# Patient Record
Sex: FEMALE | Race: WHITE | NOT HISPANIC OR LATINO | Employment: FULL TIME | URBAN - METROPOLITAN AREA
[De-identification: names, ages, dates, MRNs, and addresses within clinical notes are randomized per-mention and may not be internally consistent; named-entity substitution may affect disease eponyms.]

---

## 2020-10-07 ENCOUNTER — OFFICE VISIT CONVERTED (OUTPATIENT)
Dept: ORTHOPEDIC SURGERY | Facility: CLINIC | Age: 40
End: 2020-10-07
Attending: STUDENT IN AN ORGANIZED HEALTH CARE EDUCATION/TRAINING PROGRAM

## 2020-11-09 ENCOUNTER — OFFICE VISIT CONVERTED (OUTPATIENT)
Dept: ORTHOPEDIC SURGERY | Facility: CLINIC | Age: 40
End: 2020-11-09
Attending: STUDENT IN AN ORGANIZED HEALTH CARE EDUCATION/TRAINING PROGRAM
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
## 2021-05-14 VITALS — WEIGHT: 123.37 LBS | BODY MASS INDEX: 21.06 KG/M2 | HEIGHT: 64 IN | HEART RATE: 70 BPM | OXYGEN SATURATION: 97 %

## 2021-05-14 VITALS — BODY MASS INDEX: 20.14 KG/M2 | WEIGHT: 118 LBS | OXYGEN SATURATION: 98 % | HEART RATE: 86 BPM | HEIGHT: 64 IN

## 2021-09-13 ENCOUNTER — OFFICE VISIT (OUTPATIENT)
Dept: NEUROLOGY | Facility: CLINIC | Age: 41
End: 2021-09-13

## 2021-09-13 VITALS
WEIGHT: 124.4 LBS | BODY MASS INDEX: 21.24 KG/M2 | DIASTOLIC BLOOD PRESSURE: 66 MMHG | SYSTOLIC BLOOD PRESSURE: 103 MMHG | HEART RATE: 75 BPM | HEIGHT: 64 IN

## 2021-09-13 DIAGNOSIS — Z87.828 HISTORY OF HEAD INJURY: ICD-10-CM

## 2021-09-13 DIAGNOSIS — R41.3 MEMORY LOSS: Primary | ICD-10-CM

## 2021-09-13 DIAGNOSIS — F07.81 POST CONCUSSIVE SYNDROME: ICD-10-CM

## 2021-09-13 PROCEDURE — 99204 OFFICE O/P NEW MOD 45 MIN: CPT | Performed by: NURSE PRACTITIONER

## 2021-09-13 RX ORDER — DIAZEPAM 5 MG/1
5 TABLET ORAL 2 TIMES DAILY PRN
COMMUNITY
Start: 2021-08-20 | End: 2022-04-14

## 2021-09-13 RX ORDER — SECUKINUMAB 150 MG/ML
150 INJECTION SUBCUTANEOUS
COMMUNITY
Start: 2021-08-25

## 2021-09-13 RX ORDER — FLUOXETINE HYDROCHLORIDE 40 MG/1
40 CAPSULE ORAL DAILY
COMMUNITY
Start: 2021-08-27 | End: 2022-04-14
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
## 2021-09-13 NOTE — PATIENT INSTRUCTIONS
Concussion, Adult    A concussion is a brain injury from a hard, direct hit (trauma) to the head or body. This direct hit causes the brain to shake quickly back and forth inside the skull. This can damage brain cells and cause chemical changes in the brain. A concussion may also be known as a mild traumatic brain injury (TBI).  Concussions are usually not life-threatening, but the effects of a concussion can be serious. If you have a concussion, you should be very careful to avoid having a second concussion.  What are the causes?  This condition is caused by:  · A direct hit to your head, such as:  ? Running into another player during a game.  ? Being hit in a fight.  ? Hitting your head on a hard surface.  · Sudden movement of your body that causes your brain to move back and forth inside the skull, such as in a car crash.  What are the signs or symptoms?  The signs of a concussion can be hard to notice. Early on, they may be missed by you, family members, and health care providers. You may look fine on the outside but may act or feel differently.  Every head injury is different. Symptoms are usually temporary but may last for days, weeks, or even months. Some symptoms appear right away, but other symptoms may not show up for hours or days. If your symptoms last longer than normal, you may have post-concussion syndrome.  Physical symptoms  · Headaches.  · Dizziness and problems with coordination or balance.  · Sensitivity to light or noise.  · Nausea or vomiting.  · Tiredness (fatigue).  · Vision or hearing problems.  · Changes in eating or sleeping patterns.  · Seizure.  Mental and emotional symptoms  · Irritability or mood changes.  · Memory problems.  · Trouble concentrating, organizing, or making decisions.  · Slowness in thinking, acting or reacting, speaking, or reading.  · Anxiety or depression.  How is this diagnosed?  This condition is diagnosed based on:  · Your symptoms.  · A description of your  injury.  You may also have tests, including:  · Imaging tests, such as a CT scan or an MRI.  · Neuropsychological tests. These measure your thinking, understanding, learning, and remembering abilities.  How is this treated?  Treatment for this condition includes:  · Stopping sports or activity if you are injured. If you hit your head or show signs of concussion:  ? Do not return to sports or activities the same day.  ? Get checked by a health care provider before you return to your activities.  · Physical and mental rest and careful observation, usually at home. Gradually return to your normal activities.  · Medicines to help with symptoms such as headaches, nausea, or difficulty sleeping.  ? Avoid taking opioid pain medicine while recovering from a concussion.  · Avoiding alcohol and drugs. These may slow your recovery and can put you at risk of further injury.  · Referral to a concussion clinic or rehabilitation center.  Recovery from a concussion can take time. How fast you recover depends on many factors. Return to activities only when:  · Your symptoms are completely gone.  · Your health care provider says that it is safe.  Follow these instructions at home:  Activity  · Limit activities that require a lot of thought or concentration, such as:  ? Doing homework or job-related work.  ? Watching TV.  ? Working on the computer or phone.  ? Playing memory games and puzzles.  · Rest. Rest helps your brain heal. Make sure you:  ? Get plenty of sleep. Most adults should get 7-9 hours of sleep each night.  ? Rest during the day. Take naps or rest breaks when you feel tired.  · Avoid physical activity like exercise until your health care provider says it is safe. Stop any activity that worsens symptoms.  · Do not do high-risk activities that could cause a second concussion, such as riding a bike or playing sports.  · Ask your health care provider when you can return to your normal activities, such as school, work,  athletics, and driving. Your ability to react may be slower after a brain injury. Never do these activities if you are dizzy. Your health care provider will likely give you a plan for gradually returning to activities.  General instructions    · Take over-the-counter and prescription medicines only as told by your health care provider. Some medicines, such as blood thinners (anticoagulants) and aspirin, may increase the risk for complications, such as bleeding.  · Do not drink alcohol until your health care provider says you can.  · Watch your symptoms and tell others around you to do the same. Complications sometimes occur after a concussion. Older adults with a brain injury may have a higher risk of serious complications.  · Tell your , teachers, school nurse, school counselor, , or  about your injury, symptoms, and restrictions.  · Keep all follow-up visits as told by your health care provider. This is important.    How is this prevented?  Avoiding another brain injury is very important. In rare cases, another injury can lead to permanent brain damage, brain swelling, or death. The risk of this is greatest during the first 7-10 days after a head injury. Avoid injuries by:  · Stopping activities that could lead to a second concussion, such as contact or recreational sports, until your health care provider says it is okay.  · Taking these actions once you have returned to sports or activities:  ? Avoiding plays or moves that can cause you to crash into another person. This is how most concussions occur.  ? Following the rules and being respectful of other players. Do not engage in violent or illegal plays.  · Getting regular exercise that includes strength and balance training.  · Wearing a properly fitting helmet during sports, biking, or other activities. Helmets can help protect you from serious skull and brain injuries, but they may not protect you from a concussion. Even when  wearing a helmet, you should avoid being hit in the head.  Contact a health care provider if:  · Your symptoms do not improve.  · You have new symptoms.  · You have another injury.  Get help right away if:  · You have new or worsening physical symptoms, such as:  ? A severe or worsening headache.  ? Weakness or numbness in any part of your body, slurred speech, vision changes, or confusion.  ? Your coordination gets worse.  ? Vomiting repeatedly.  ? You have a seizure.  ? You have unusual behavior changes.  ? You lose consciousness, are sleepier than normal, or are difficult to wake up.  These symptoms may represent a serious problem that is an emergency. Do not wait to see if the symptoms will go away. Get medical help right away. Call your local emergency services (911 in the [place] Do not drive yourself to the hospital.  Summary  · A concussion is a brain injury that results from a hard, direct hit (trauma) to your head or body.  · You may have imaging tests and neuropsychological tests to diagnose a concussion.  · Treatment for this condition includes physical and mental rest and careful observation.  · Ask your health care provider when you can return to your normal activities, such as school, work, athletics, and driving.  · Get help right away if you have a severe headache, weakness in any part of the body, seizures, behavior changes, changes in vision, or if you are confused or sleepier than normal.  This information is not intended to replace advice given to you by your health care provider. Make sure you discuss any questions you have with your health care provider.  Document Revised: 10/29/2020 Document Reviewed: 10/29/2020  Elsevier Patient Education © 2021 [employer]
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
## 2021-09-27 ENCOUNTER — LAB (OUTPATIENT)
Dept: LAB | Facility: HOSPITAL | Age: 41
End: 2021-09-27

## 2021-09-27 ENCOUNTER — HOSPITAL ENCOUNTER (OUTPATIENT)
Dept: MRI IMAGING | Facility: HOSPITAL | Age: 41
Discharge: HOME | End: 2021-09-27

## 2021-09-27 DIAGNOSIS — F07.81 POST CONCUSSIVE SYNDROME: ICD-10-CM

## 2021-09-27 DIAGNOSIS — R41.3 MEMORY LOSS: ICD-10-CM

## 2021-09-27 DIAGNOSIS — Z87.828 HISTORY OF HEAD INJURY: ICD-10-CM

## 2021-09-27 LAB
ALBUMIN SERPL-MCNC: 4.3 G/DL (ref 3.5–5.2)
ALBUMIN/GLOB SERPL: 1.3 G/DL
ALP SERPL-CCNC: 59 U/L (ref 39–117)
ALT SERPL W P-5'-P-CCNC: 17 U/L (ref 1–33)
ANION GAP SERPL CALCULATED.3IONS-SCNC: 9.5 MMOL/L (ref 5–15)
AST SERPL-CCNC: 20 U/L (ref 1–32)
BILIRUB SERPL-MCNC: 0.2 MG/DL (ref 0–1.2)
BUN SERPL-MCNC: 19 MG/DL (ref 6–20)
BUN/CREAT SERPL: 24.7 (ref 7–25)
CALCIUM SPEC-SCNC: 8.8 MG/DL (ref 8.6–10.5)
CHLORIDE SERPL-SCNC: 104 MMOL/L (ref 98–107)
CO2 SERPL-SCNC: 24.5 MMOL/L (ref 22–29)
CREAT SERPL-MCNC: 0.77 MG/DL (ref 0.57–1)
DEPRECATED RDW RBC AUTO: 47.3 FL (ref 37–54)
ERYTHROCYTE [DISTWIDTH] IN BLOOD BY AUTOMATED COUNT: 14.6 % (ref 12.3–15.4)
FOLATE SERPL-MCNC: 7.2 NG/ML (ref 4.78–24.2)
GFR SERPL CREATININE-BSD FRML MDRD: 82 ML/MIN/1.73
GLOBULIN UR ELPH-MCNC: 3.3 GM/DL
GLUCOSE SERPL-MCNC: 84 MG/DL (ref 65–99)
HCT VFR BLD AUTO: 32.3 % (ref 34–46.6)
HGB BLD-MCNC: 10.5 G/DL (ref 12–15.9)
MCH RBC QN AUTO: 28.8 PG (ref 26.6–33)
MCHC RBC AUTO-ENTMCNC: 32.5 G/DL (ref 31.5–35.7)
MCV RBC AUTO: 88.7 FL (ref 79–97)
PLATELET # BLD AUTO: 495 10*3/MM3 (ref 140–450)
PMV BLD AUTO: 9.7 FL (ref 6–12)
POTASSIUM SERPL-SCNC: 4.2 MMOL/L (ref 3.5–5.2)
PROT SERPL-MCNC: 7.6 G/DL (ref 6–8.5)
RBC # BLD AUTO: 3.64 10*6/MM3 (ref 3.77–5.28)
SODIUM SERPL-SCNC: 138 MMOL/L (ref 136–145)
VIT B12 BLD-MCNC: 381 PG/ML (ref 211–946)
WBC # BLD AUTO: 5.2 10*3/MM3 (ref 3.4–10.8)

## 2021-09-27 PROCEDURE — 85027 COMPLETE CBC AUTOMATED: CPT

## 2021-09-27 PROCEDURE — 36415 COLL VENOUS BLD VENIPUNCTURE: CPT

## 2021-09-27 PROCEDURE — 0 GADOBENATE DIMEGLUMINE 529 MG/ML SOLUTION: Performed by: NURSE PRACTITIONER

## 2021-09-27 PROCEDURE — 80053 COMPREHEN METABOLIC PANEL: CPT

## 2021-09-27 PROCEDURE — 82746 ASSAY OF FOLIC ACID SERUM: CPT

## 2021-09-27 PROCEDURE — 82607 VITAMIN B-12: CPT

## 2021-09-27 PROCEDURE — A9577 INJ MULTIHANCE: HCPCS | Performed by: NURSE PRACTITIONER

## 2021-09-27 PROCEDURE — 70553 MRI BRAIN STEM W/O & W/DYE: CPT

## 2021-09-27 RX ADMIN — GADOBENATE DIMEGLUMINE 10 ML: 529 INJECTION, SOLUTION INTRAVENOUS at 07:45

## 2021-09-28 ENCOUNTER — TELEPHONE (OUTPATIENT)
Dept: NEUROLOGY | Facility: CLINIC | Age: 41
End: 2021-09-28

## 2021-09-28 RX ORDER — MAGNESIUM 200 MG
1000 TABLET ORAL DAILY
Qty: 30 EACH | Refills: 3 | Status: SHIPPED | OUTPATIENT
Start: 2021-09-28 | End: 2022-04-14

## 2021-09-28 NOTE — TELEPHONE ENCOUNTER
----- Message from QUIQUE Mariano sent at 9/28/2021  7:46 AM EDT -----  B12 low, I have called in SL supplementation.

## 2021-09-28 NOTE — TELEPHONE ENCOUNTER
I called and notified the pt and she demonstrated understanding by restatement. I asked that she call with furhter questions or concerns and she said she would.  I faxed labs to [facility] Michele Mcgovern @ [phone]

## 2021-10-04 ENCOUNTER — TELEPHONE (OUTPATIENT)
Dept: NEUROLOGY | Facility: CLINIC | Age: 41
End: 2021-10-04

## 2021-10-04 NOTE — TELEPHONE ENCOUNTER
PATIENT AWARE AND VERBALIZED UNDERSTANDING. PATIENT STATED SHE WILL PICKUP HER MEDICATION AND START ASAP.

## 2022-01-11 RX ORDER — MAGNESIUM 200 MG
TABLET ORAL
Qty: 30 TABLET | Refills: 3 | OUTPATIENT
Start: 2022-01-11

## 2022-01-11 NOTE — TELEPHONE ENCOUNTER
Refill denied. Pt canceled last appointment on 10/26/2021 and does not currently have a f/u on the schedule.

## 2022-01-18 RX ORDER — MAGNESIUM 200 MG
TABLET ORAL
Qty: 30 TABLET | Refills: 3 | OUTPATIENT
Start: 2022-01-18

## 2022-01-24 RX ORDER — MAGNESIUM 200 MG
TABLET ORAL
Qty: 30 TABLET | Refills: 3 | OUTPATIENT
Start: 2022-01-24

## 2022-02-02 ENCOUNTER — TELEPHONE (OUTPATIENT)
Dept: FAMILY MEDICINE CLINIC | Facility: CLINIC | Age: 42
End: 2022-02-02

## 2022-02-02 NOTE — TELEPHONE ENCOUNTER
jamal was calling about getting a new diagnostic order for patient's left breast.   Patient appointment it 2/7/22 at 9:30am

## 2022-04-14 ENCOUNTER — OFFICE VISIT (OUTPATIENT)
Dept: OBSTETRICS AND GYNECOLOGY | Facility: CLINIC | Age: 42
End: 2022-04-14

## 2022-04-14 VITALS
DIASTOLIC BLOOD PRESSURE: 69 MMHG | HEART RATE: 76 BPM | WEIGHT: 126 LBS | SYSTOLIC BLOOD PRESSURE: 115 MMHG | BODY MASS INDEX: 21.51 KG/M2 | HEIGHT: 64 IN

## 2022-04-14 DIAGNOSIS — N93.9 ABNORMAL UTERINE BLEEDING (AUB): ICD-10-CM

## 2022-04-14 DIAGNOSIS — Z32.02 PREGNANCY TEST NEGATIVE: Primary | ICD-10-CM

## 2022-04-14 LAB
B-HCG UR QL: NEGATIVE
C TRACH RRNA CVX QL NAA+PROBE: NOT DETECTED
CANDIDA SPECIES: POSITIVE
EXPIRATION DATE: NORMAL
GARDNERELLA VAGINALIS: POSITIVE
INTERNAL NEGATIVE CONTROL: NORMAL
INTERNAL POSITIVE CONTROL: NORMAL
Lab: NORMAL
N GONORRHOEA RRNA SPEC QL NAA+PROBE: NOT DETECTED
T VAGINALIS DNA VAG QL PROBE+SIG AMP: NEGATIVE

## 2022-04-14 PROCEDURE — G0123 SCREEN CERV/VAG THIN LAYER: HCPCS | Performed by: STUDENT IN AN ORGANIZED HEALTH CARE EDUCATION/TRAINING PROGRAM

## 2022-04-14 PROCEDURE — 81025 URINE PREGNANCY TEST: CPT | Performed by: STUDENT IN AN ORGANIZED HEALTH CARE EDUCATION/TRAINING PROGRAM

## 2022-04-14 PROCEDURE — 87510 GARDNER VAG DNA DIR PROBE: CPT | Performed by: STUDENT IN AN ORGANIZED HEALTH CARE EDUCATION/TRAINING PROGRAM

## 2022-04-14 PROCEDURE — 87624 HPV HI-RISK TYP POOLED RSLT: CPT | Performed by: STUDENT IN AN ORGANIZED HEALTH CARE EDUCATION/TRAINING PROGRAM

## 2022-04-14 PROCEDURE — 87480 CANDIDA DNA DIR PROBE: CPT | Performed by: STUDENT IN AN ORGANIZED HEALTH CARE EDUCATION/TRAINING PROGRAM

## 2022-04-14 PROCEDURE — 99204 OFFICE O/P NEW MOD 45 MIN: CPT | Performed by: STUDENT IN AN ORGANIZED HEALTH CARE EDUCATION/TRAINING PROGRAM

## 2022-04-14 PROCEDURE — 87491 CHLMYD TRACH DNA AMP PROBE: CPT | Performed by: STUDENT IN AN ORGANIZED HEALTH CARE EDUCATION/TRAINING PROGRAM

## 2022-04-14 PROCEDURE — 87591 N.GONORRHOEAE DNA AMP PROB: CPT | Performed by: STUDENT IN AN ORGANIZED HEALTH CARE EDUCATION/TRAINING PROGRAM

## 2022-04-14 PROCEDURE — 87660 TRICHOMONAS VAGIN DIR PROBE: CPT | Performed by: STUDENT IN AN ORGANIZED HEALTH CARE EDUCATION/TRAINING PROGRAM

## 2022-04-14 RX ORDER — FERROUS SULFATE 325(65) MG
325 TABLET ORAL EVERY OTHER DAY
Qty: 30 TABLET | Refills: 1 | Status: SHIPPED | OUTPATIENT
Start: 2022-04-14

## 2022-04-14 NOTE — PROGRESS NOTES
"GYN Problem/Follow Up Visit    Chief Complaint   Patient presents with   • AUB           HPI  Dian Perea is a 41 y.o. female, , who presents for evaluation for abnormal uterine bleeding. Periods have been off since Nov/Dec.  Did evaluation at PCP and was found to be anemic. Started menses at 9 yrs of age. Reports regular menses, 28-29 days between, her periods would last 4-5 days. She would not have any pain associated with them. 2 previous vaginal deliveries, last . Since Nov/Dec 2021 her menses began becoming every 2 weeks. She did have a cycle of of 14 days in December. /Feb they had improved. March has been normal. Denies any vaginal discharge, no vaginal irritation. She is sexually active, no blood after intercourse. No hx of STIs. No blood in urine no dysuria. No issues with BM, no blood in stools. No pelvic pressure or pain. Nonsmoker. Reports Hx of abnormal PAP smear; reports repeated 6 months later and normal. Did have a tubal ligation via laparoscopy. No other abdominal surgeries. Reports getting routine labs with PCP and no abnormalities. Denies feeling fatigued, weak or any change in appetite. She was not started on iron supplementation.  Did have COVID in January of this year.    Additional OB/GYN History   Patient's last menstrual period was 2022.  Current contraception: contraceptive methods: Tubal ligation  Desires to: do not start contraception  Allergies : Penicillins     The additional following portions of the patient's history were reviewed and updated as appropriate: allergies, current medications, past family history, past medical history, past social history, past surgical history and problem list.    Review of Systems    I have reviewed and agree with the HPI, ROS, and historical information as entered above. Barertt Kyle MD    Objective   /69   Pulse 76   Ht 162.6 cm (64\")   Wt 57.2 kg (126 lb)   LMP 2022   BMI 21.63 kg/m²     Physical " Exam  Vitals and nursing note reviewed. Exam conducted with a chaperone present.   Constitutional:       General: She is not in acute distress.     Appearance: Normal appearance. She is not toxic-appearing.   HENT:      Head: Normocephalic and atraumatic.   Eyes:      Extraocular Movements: Extraocular movements intact.      Conjunctiva/sclera: Conjunctivae normal.   Cardiovascular:      Pulses: Normal pulses.   Pulmonary:      Effort: Pulmonary effort is normal.   Abdominal:      General: There is no distension.      Palpations: Abdomen is soft.      Tenderness: There is no abdominal tenderness.   Genitourinary:     General: Normal vulva.      Exam position: Lithotomy position.      Labia:         Right: No tenderness, lesion or injury.         Left: No tenderness, lesion or injury.       Vagina: Normal.      Cervix: Normal.      Uterus: Normal.       Adnexa: Right adnexa normal and left adnexa normal.   Musculoskeletal:      Cervical back: Normal range of motion.   Skin:     General: Skin is warm and dry.   Neurological:      Mental Status: She is alert and oriented to person, place, and time.   Psychiatric:         Mood and Affect: Mood normal.         Behavior: Behavior normal.         Thought Content: Thought content normal.            Assessment and Plan  Dian Perea is a 41 y.o.  presenting for acute abnormal uterine bleeding.  Patient asymptomatic from blood loss.  Review of outside records showing hemoglobin hematocrit 9.7/31.2 MCV 88.  Iron battery showing low ferritin.  Discussed etiologies of abnormal uterine bleeding.  Recommendation for vitamin B12 and folate for MCV normal though showing iron deficiency anemia.  Also recommendation for getting TSH/T4.  Infectious work-up done today also recommendation for transvaginal ultrasound to look for any structural abnormality.  Discussed with patient this could have been reflective of her recent infection with COVID as we have been seeing  abnormal uterine bleeding associated with COVID infection.  She will follow-up in 8 to 12 weeks.  Recommendation for getting iron every other day.    Diagnoses and all orders for this visit:    1. Pregnancy test negative (Primary)  -     POC Pregnancy, Urine    2. Abnormal uterine bleeding (AUB)  Overview:  4/14/2022 Earlier this year; COVID in January. Cycles regulating since    Orders:  -     Gardnerella vaginalis, Trichomonas vaginalis, Candida albicans, DNA - Swab, Vagina  -     Chlamydia trachomatis, Neisseria gonorrhoeae, PCR - Swab, Cervix  -     IgP, Aptima HPV  -     TSH  -     T4, Free  -     Vitamin B12 & Folate  -     US Non-ob Transvaginal; Future  -     ferrous sulfate 325 (65 FE) MG tablet; Take 1 tablet by mouth Every Other Day.  Dispense: 30 tablet; Refill: 1    Other orders  -     SCANNED - LABS  -     SCANNED - LABS  -     SCANNED - LABS      Counseling:  • Track menstrual cycle  • Bleeding precautions      She understands the importance of having the above orders performed in a timely fashion.  The risks of not performing them include, but are not limited to, cancer and/or subsequent increase in morbidity and/or mortality.  She is encouraged to review her results online and/or contact or office if she has questions.     Follow Up:  Return in about 8 weeks (around 6/9/2022) for Next scheduled follow up.        Barrett Kyle MD  04/14/2022

## 2022-04-15 RX ORDER — METRONIDAZOLE 500 MG/1
500 TABLET ORAL 2 TIMES DAILY
Qty: 14 TABLET | Refills: 0 | Status: SHIPPED | OUTPATIENT
Start: 2022-04-15 | End: 2022-04-22

## 2022-04-15 RX ORDER — FLUCONAZOLE 150 MG/1
150 TABLET ORAL ONCE
Qty: 1 TABLET | Refills: 0 | Status: SHIPPED | OUTPATIENT
Start: 2022-04-15 | End: 2022-04-15

## 2022-04-19 ENCOUNTER — TELEPHONE (OUTPATIENT)
Dept: OBSTETRICS AND GYNECOLOGY | Facility: CLINIC | Age: 42
End: 2022-04-19

## 2022-04-19 NOTE — TELEPHONE ENCOUNTER
Patient informed she needs to have the labs and will have those collected when she goes for her ultrasound tomorrow.

## 2022-04-19 NOTE — TELEPHONE ENCOUNTER
Called patient to discuss getting labs ordered 4/14. She states that she had recently had labs at her PCP's office and didn't remember discussing her needing more labs. Do you still want her to have the TSH, Free T4, and Vitamin B12 & Folate collected? She is going for an ultrasound 4/20/22 in the afternoon and can have it collected then if still needed. Please advise.

## 2022-04-20 ENCOUNTER — LAB (OUTPATIENT)
Dept: LAB | Facility: HOSPITAL | Age: 42
End: 2022-04-20

## 2022-04-20 ENCOUNTER — HOSPITAL ENCOUNTER (OUTPATIENT)
Dept: ULTRASOUND IMAGING | Facility: HOSPITAL | Age: 42
Discharge: HOME | End: 2022-04-20

## 2022-04-20 DIAGNOSIS — N93.9 ABNORMAL UTERINE BLEEDING (AUB): ICD-10-CM

## 2022-04-20 LAB
FOLATE SERPL-MCNC: 8.62 NG/ML (ref 4.78–24.2)
T4 FREE SERPL-MCNC: 1.17 NG/DL (ref 0.93–1.7)
TSH SERPL DL<=0.05 MIU/L-ACNC: 1.5 UIU/ML (ref 0.27–4.2)
VIT B12 BLD-MCNC: 369 PG/ML (ref 211–946)

## 2022-04-20 PROCEDURE — 82607 VITAMIN B-12: CPT | Performed by: STUDENT IN AN ORGANIZED HEALTH CARE EDUCATION/TRAINING PROGRAM

## 2022-04-20 PROCEDURE — 76830 TRANSVAGINAL US NON-OB: CPT

## 2022-04-20 PROCEDURE — 82746 ASSAY OF FOLIC ACID SERUM: CPT | Performed by: STUDENT IN AN ORGANIZED HEALTH CARE EDUCATION/TRAINING PROGRAM

## 2022-04-20 PROCEDURE — 36415 COLL VENOUS BLD VENIPUNCTURE: CPT | Performed by: STUDENT IN AN ORGANIZED HEALTH CARE EDUCATION/TRAINING PROGRAM

## 2022-04-20 PROCEDURE — 84439 ASSAY OF FREE THYROXINE: CPT | Performed by: STUDENT IN AN ORGANIZED HEALTH CARE EDUCATION/TRAINING PROGRAM

## 2022-04-20 PROCEDURE — 84443 ASSAY THYROID STIM HORMONE: CPT | Performed by: STUDENT IN AN ORGANIZED HEALTH CARE EDUCATION/TRAINING PROGRAM

## 2022-04-22 LAB
CYTOLOGIST CVX/VAG CYTO: ABNORMAL
CYTOLOGY CVX/VAG DOC CYTO: ABNORMAL
CYTOLOGY CVX/VAG DOC THIN PREP: ABNORMAL
DX ICD CODE: ABNORMAL
DX ICD CODE: ABNORMAL
HIV 1 & 2 AB SER-IMP: ABNORMAL
HPV I/H RISK 4 DNA CVX QL PROBE+SIG AMP: NEGATIVE
OTHER STN SPEC: ABNORMAL
PATHOLOGIST CVX/VAG CYTO: ABNORMAL
STAT OF ADQ CVX/VAG CYTO-IMP: ABNORMAL

## 2022-04-25 ENCOUNTER — TELEPHONE (OUTPATIENT)
Dept: OBSTETRICS AND GYNECOLOGY | Facility: CLINIC | Age: 42
End: 2022-04-25

## 2022-04-25 NOTE — TELEPHONE ENCOUNTER
----- Message from Barrett Kyle MD sent at 4/23/2022 10:50 AM EDT -----  ASCUS with HPV negative; [facility] recommendation for PAP/cotesting in 3 years.

## 2022-04-25 NOTE — TELEPHONE ENCOUNTER
Patient advised of pap results being ASCUS/HPV negative and recommendations being to repeat pap with cotest in 3 years. Advised would still need yearly breast and pelvic exam. Also advised labs were WNL.

## 2022-06-01 DIAGNOSIS — N76.0 BV (BACTERIAL VAGINOSIS): ICD-10-CM

## 2022-06-01 DIAGNOSIS — B96.89 BV (BACTERIAL VAGINOSIS): ICD-10-CM

## 2022-06-01 DIAGNOSIS — B37.9 YEAST INFECTION: Primary | ICD-10-CM

## 2022-06-01 RX ORDER — FLUCONAZOLE 150 MG/1
150 TABLET ORAL ONCE
COMMUNITY
Start: 2022-04-15 | End: 2022-06-01 | Stop reason: SDUPTHER

## 2022-06-01 RX ORDER — METRONIDAZOLE 500 MG/1
500 TABLET ORAL 2 TIMES DAILY
Qty: 14 TABLET | Refills: 0 | Status: SHIPPED | OUTPATIENT
Start: 2022-06-01 | End: 2022-06-08

## 2022-06-01 RX ORDER — FLUCONAZOLE 150 MG/1
150 TABLET ORAL ONCE
Qty: 1 TABLET | Refills: 0 | Status: SHIPPED | OUTPATIENT
Start: 2022-06-01 | End: 2022-06-01

## 2022-06-01 NOTE — TELEPHONE ENCOUNTER
Pt called requesting refill on diflucan and flagyl 500 that was prescribed by DR. CASTANEDA back in April. Pt states she is having all the same sx as last time.    1689

## 2022-06-15 ENCOUNTER — OFFICE VISIT (OUTPATIENT)
Dept: OBSTETRICS AND GYNECOLOGY | Facility: CLINIC | Age: 42
End: 2022-06-15

## 2022-06-15 VITALS
BODY MASS INDEX: 21.46 KG/M2 | WEIGHT: 125 LBS | DIASTOLIC BLOOD PRESSURE: 53 MMHG | SYSTOLIC BLOOD PRESSURE: 112 MMHG | HEART RATE: 81 BPM

## 2022-06-15 DIAGNOSIS — N93.9 ABNORMAL UTERINE BLEEDING (AUB): Primary | ICD-10-CM

## 2022-06-15 PROCEDURE — 99213 OFFICE O/P EST LOW 20 MIN: CPT | Performed by: STUDENT IN AN ORGANIZED HEALTH CARE EDUCATION/TRAINING PROGRAM

## 2022-06-15 NOTE — PROGRESS NOTES
GYN Problem/Follow Up Visit    Chief Complaint   Patient presents with   • Follow-up     AUB           HPI  Dian Perea is a 41 y.o. female, , who presents for follow up for abnormal uterine bleeding. Normal February to May. Had period 2022; normal 3-5 days ; and then had one day bleeding on .  Was treated for BV and yeast. This was treated. Did have concerns last week for BV and requested another Flagyl refill. It will be a week tomorrow when this is completed. This was spurred by odor and irritation. Not taking any medications.  Denies any concerns with her bleeding profile.    Additional OB/GYN History   Patient's last menstrual period was 2022.  Current contraception: contraceptive methods: Tubal ligation  Desires to: continue contraception  Allergies : Penicillins     The additional following portions of the patient's history were reviewed and updated as appropriate: allergies, current medications, past family history, past medical history, past social history, past surgical history and problem list.    Review of Systems   Constitutional: Negative for fatigue and fever.   Respiratory: Negative for cough and shortness of breath.    Cardiovascular: Negative for chest pain.   Gastrointestinal: Negative for abdominal distention and abdominal pain.   Genitourinary: Positive for vaginal discharge. Negative for difficulty urinating, dysuria and menstrual problem.       I have reviewed and agree with the HPI, ROS, and historical information as entered above. Barrett Kyle MD    Objective   /53   Pulse 81   Wt 56.7 kg (125 lb)   LMP 2022   BMI 21.46 kg/m²     Physical Exam  Vitals and nursing note reviewed.   Constitutional:       General: She is not in acute distress.     Appearance: Normal appearance. She is not toxic-appearing.   HENT:      Head: Normocephalic and atraumatic.   Eyes:      Extraocular Movements: Extraocular movements intact.       Conjunctiva/sclera: Conjunctivae normal.   Cardiovascular:      Pulses: Normal pulses.   Pulmonary:      Effort: Pulmonary effort is normal.   Abdominal:      General: There is no distension.      Palpations: Abdomen is soft.      Tenderness: There is no abdominal tenderness.   Musculoskeletal:      Cervical back: Normal range of motion.   Skin:     General: Skin is warm and dry.   Neurological:      Mental Status: She is alert and oriented to person, place, and time.   Psychiatric:         Mood and Affect: Mood normal.         Behavior: Behavior normal.         Thought Content: Thought content normal.            Assessment and Plan  Dian Perea is a 41 y.o.  following up for abnormal uterine bleeding.  Reports that.  Profile has returned to normal since February.  She did have a 1 day episode of bleeding here a couple days ago which was not bothersome to her.  Discussed with her the results of previous labs which she got treated for which showed a bacterial vaginosis and a yeast infection.  She recently requested additional prescription of Flagyl for odor and irritation and will have be a week tomorrow from completion of that.  Discussed with Dian the results of the transvaginal ultrasound showing the potential of a small submucosal fibroid abutting the fundal region of her endometrial stripe.  Discussed that fibroids are more than not benign and can have symptoms with regard to pelvic pressure, pelvic pain, mass-effect, and causes for abnormal bleeding.  At this time Dian feels that her periods have returned to normal and she would like to just monitor for symptoms as opposed to having any type of surgical intervention.  This is a reasonable course of action as patient is asymptomatic and has no other concerns.  Discussion regards to recurrent bacterial vaginosis and overtreatment with Flagyl.  Discussed if has having more events greater than 3 times per year we should look at the different  antibiotic course as opposed to Flagyl.  At this point time she said that that is not necessary.  We will follow-up in 1 year for an annual examination.  At her evaluation in April she did have an ASCUS Pap smear with HPV negativity    Diagnoses and all orders for this visit:    1. Abnormal uterine bleeding (AUB) (Primary)  Overview:  4/14/2022 Earlier this year; COVID in January. Cycles regulating since        She understands the importance of having the above orders performed in a timely fashion.  The risks of not performing them include, but are not limited to, cancer and/or subsequent increase in morbidity and/or mortality.  She is encouraged to review her results online and/or contact or office if she has questions.     Follow Up:  Return in about 10 months (around 4/1/2023) for Annual physical.        Barrett Kyle MD  06/15/2022

## 2022-06-29 DIAGNOSIS — N76.0 ACUTE VAGINITIS: Primary | ICD-10-CM

## 2022-06-29 RX ORDER — METRONIDAZOLE 500 MG/1
500 TABLET ORAL 2 TIMES DAILY
Qty: 14 TABLET | Refills: 0 | Status: SHIPPED | OUTPATIENT
Start: 2022-06-29 | End: 2022-07-06

## 2022-06-29 RX ORDER — FLUCONAZOLE 150 MG/1
150 TABLET ORAL
Qty: 2 TABLET | Refills: 0 | Status: SHIPPED | OUTPATIENT
Start: 2022-06-29 | End: 2022-07-03

## 2022-06-29 NOTE — TELEPHONE ENCOUNTER
Patient called requesting another refill on the Diflucan and Flagyl she was given 6/1. She is currently out of town in [place] and complains of vaginal irritation, odor and itching. Please sign the attached orders if she can have a refill again.

## 2022-10-23 ENCOUNTER — HOSPITAL ENCOUNTER (EMERGENCY)
Facility: HOSPITAL | Age: 42
Discharge: LEFT BEFORE DISCHARGE | End: 2022-10-23

## 2022-10-23 VITALS
DIASTOLIC BLOOD PRESSURE: 69 MMHG | TEMPERATURE: 98 F | WEIGHT: 134.7 LBS | RESPIRATION RATE: 18 BRPM | OXYGEN SATURATION: 100 % | HEIGHT: 64 IN | HEART RATE: 100 BPM | BODY MASS INDEX: 23 KG/M2 | SYSTOLIC BLOOD PRESSURE: 108 MMHG

## 2022-10-23 LAB
ALBUMIN SERPL-MCNC: 4.3 G/DL (ref 3.5–5.2)
ALBUMIN/GLOB SERPL: 1.1 G/DL
ALP SERPL-CCNC: 65 U/L (ref 39–117)
ALT SERPL W P-5'-P-CCNC: 14 U/L (ref 1–33)
ANION GAP SERPL CALCULATED.3IONS-SCNC: 10.3 MMOL/L (ref 5–15)
AST SERPL-CCNC: 17 U/L (ref 1–32)
BACTERIA UR QL AUTO: ABNORMAL /HPF
BASOPHILS # BLD AUTO: 0.04 10*3/MM3 (ref 0–0.2)
BASOPHILS NFR BLD AUTO: 0.5 % (ref 0–1.5)
BILIRUB SERPL-MCNC: <0.2 MG/DL (ref 0–1.2)
BILIRUB UR QL STRIP: NEGATIVE
BUN SERPL-MCNC: 15 MG/DL (ref 6–20)
BUN/CREAT SERPL: 13.8 (ref 7–25)
CALCIUM SPEC-SCNC: 8.8 MG/DL (ref 8.6–10.5)
CHLORIDE SERPL-SCNC: 104 MMOL/L (ref 98–107)
CLARITY UR: ABNORMAL
CO2 SERPL-SCNC: 23.7 MMOL/L (ref 22–29)
COLOR UR: YELLOW
CREAT SERPL-MCNC: 1.09 MG/DL (ref 0.57–1)
DEPRECATED RDW RBC AUTO: 50.3 FL (ref 37–54)
EGFRCR SERPLBLD CKD-EPI 2021: 64.8 ML/MIN/1.73
EOSINOPHIL # BLD AUTO: 0.01 10*3/MM3 (ref 0–0.4)
EOSINOPHIL NFR BLD AUTO: 0.1 % (ref 0.3–6.2)
ERYTHROCYTE [DISTWIDTH] IN BLOOD BY AUTOMATED COUNT: 16.4 % (ref 12.3–15.4)
GLOBULIN UR ELPH-MCNC: 3.8 GM/DL
GLUCOSE SERPL-MCNC: 98 MG/DL (ref 65–99)
GLUCOSE UR STRIP-MCNC: NEGATIVE MG/DL
HCG INTACT+B SERPL-ACNC: <0.5 MIU/ML
HCT VFR BLD AUTO: 32.9 % (ref 34–46.6)
HGB BLD-MCNC: 10.6 G/DL (ref 12–15.9)
HGB UR QL STRIP.AUTO: ABNORMAL
HOLD SPECIMEN: NORMAL
HOLD SPECIMEN: NORMAL
HYALINE CASTS UR QL AUTO: ABNORMAL /LPF
IMM GRANULOCYTES # BLD AUTO: 0.02 10*3/MM3 (ref 0–0.05)
IMM GRANULOCYTES NFR BLD AUTO: 0.3 % (ref 0–0.5)
KETONES UR QL STRIP: NEGATIVE
LEUKOCYTE ESTERASE UR QL STRIP.AUTO: ABNORMAL
LIPASE SERPL-CCNC: 28 U/L (ref 13–60)
LYMPHOCYTES # BLD AUTO: 1.73 10*3/MM3 (ref 0.7–3.1)
LYMPHOCYTES NFR BLD AUTO: 23.5 % (ref 19.6–45.3)
MCH RBC QN AUTO: 27 PG (ref 26.6–33)
MCHC RBC AUTO-ENTMCNC: 32.2 G/DL (ref 31.5–35.7)
MCV RBC AUTO: 83.7 FL (ref 79–97)
MONOCYTES # BLD AUTO: 1.13 10*3/MM3 (ref 0.1–0.9)
MONOCYTES NFR BLD AUTO: 15.4 % (ref 5–12)
NEUTROPHILS NFR BLD AUTO: 4.42 10*3/MM3 (ref 1.7–7)
NEUTROPHILS NFR BLD AUTO: 60.2 % (ref 42.7–76)
NITRITE UR QL STRIP: NEGATIVE
NRBC BLD AUTO-RTO: 0 /100 WBC (ref 0–0.2)
PH UR STRIP.AUTO: 6 [PH] (ref 5–8)
PLATELET # BLD AUTO: 496 10*3/MM3 (ref 140–450)
PMV BLD AUTO: 8.5 FL (ref 6–12)
POTASSIUM SERPL-SCNC: 3.8 MMOL/L (ref 3.5–5.2)
PROT SERPL-MCNC: 8.1 G/DL (ref 6–8.5)
PROT UR QL STRIP: ABNORMAL
RBC # BLD AUTO: 3.93 10*6/MM3 (ref 3.77–5.28)
RBC # UR STRIP: ABNORMAL /HPF
REF LAB TEST METHOD: ABNORMAL
SODIUM SERPL-SCNC: 138 MMOL/L (ref 136–145)
SP GR UR STRIP: >=1.03 (ref 1–1.03)
SQUAMOUS #/AREA URNS HPF: ABNORMAL /HPF
UROBILINOGEN UR QL STRIP: ABNORMAL
WBC # UR STRIP: ABNORMAL /HPF
WBC NRBC COR # BLD: 7.35 10*3/MM3 (ref 3.4–10.8)
WHOLE BLOOD HOLD COAG: NORMAL
WHOLE BLOOD HOLD SPECIMEN: NORMAL

## 2022-10-23 PROCEDURE — 80053 COMPREHEN METABOLIC PANEL: CPT

## 2022-10-23 PROCEDURE — 85025 COMPLETE CBC W/AUTO DIFF WBC: CPT

## 2022-10-23 PROCEDURE — 84702 CHORIONIC GONADOTROPIN TEST: CPT

## 2022-10-23 PROCEDURE — 81001 URINALYSIS AUTO W/SCOPE: CPT

## 2022-10-23 PROCEDURE — 99211 OFF/OP EST MAY X REQ PHY/QHP: CPT

## 2022-10-23 PROCEDURE — 36415 COLL VENOUS BLD VENIPUNCTURE: CPT

## 2022-10-23 PROCEDURE — 83690 ASSAY OF LIPASE: CPT

## 2022-10-23 RX ORDER — SODIUM CHLORIDE 0.9 % (FLUSH) 0.9 %
10 SYRINGE (ML) INJECTION AS NEEDED
Status: DISCONTINUED | OUTPATIENT
Start: 2022-10-23 | End: 2022-10-23 | Stop reason: HOSPADM